# Patient Record
Sex: FEMALE | Race: WHITE | NOT HISPANIC OR LATINO | URBAN - METROPOLITAN AREA
[De-identification: names, ages, dates, MRNs, and addresses within clinical notes are randomized per-mention and may not be internally consistent; named-entity substitution may affect disease eponyms.]

---

## 2017-07-21 PROBLEM — 63305008 STRICTURE OF ESOPHAGUS: Status: ACTIVE | Noted: 2017-07-21

## 2020-07-30 ENCOUNTER — OFFICE VISIT (OUTPATIENT)
Dept: URBAN - METROPOLITAN AREA CLINIC 78 | Facility: CLINIC | Age: 85
End: 2020-07-30

## 2020-07-30 RX ORDER — LISINOPRIL 20 MG/1
TABLET ORAL
Qty: 0 | Refills: 0 | Status: ACTIVE | COMMUNITY
Start: 1900-01-01 | End: 1900-01-01

## 2020-07-30 RX ORDER — CHROMIUM 200 MCG
TABLET ORAL
Qty: 0 | Refills: 0 | Status: ACTIVE | COMMUNITY
Start: 1900-01-01 | End: 1900-01-01

## 2020-07-30 RX ORDER — BISACODYL 5 MG/1
TAKE 2 TABLETS BY ORAL ROUTE ONCE TABLET, COATED ORAL
Qty: 2 | Refills: 0 | Status: ACTIVE | COMMUNITY
Start: 2018-01-08 | End: 1900-01-01

## 2020-07-30 RX ORDER — APIXABAN 2.5 MG/1
TABLET, FILM COATED ORAL
Qty: 0 | Refills: 0 | Status: ACTIVE | COMMUNITY
Start: 1900-01-01 | End: 1900-01-01

## 2020-07-30 NOTE — HPI-OTHER HISTORIES
Patient had been last seen at [facility] by Dr. Beni Bhatia in 2017 for diarrhea.  Patient is on Eliquis for A fib. She also had 2 TIAs.  History of diverticulitis requiring colectomy performed 15 year ago. Patients sister has severe Crohn's disease.  Patient reports frequent loose stools, few streaks of fresh blood. Along with right lower quadrant abdominal pain. She reports symptoms are worse with gluten, diary and sugar.   Summary of prior workup:  - EGD and colonoscopy by Dr. Beni Bhatia on 2/21/18 revealed a prior end-to-end colonic anastomosis in the descending colon which was patent and characterized by healthy appearing mucosa. Biopsies were negative for microscopic colitis. Terminal ileum normal (biopsies consistent with normal villoous architecture). Ascending/transverse/descending diverticulosis. - Bloodwork on 12/15/17 revealed a normal CMP except for a creatinine of 0.9, normal CBC and CRP of 2.2. GI PCR panel was negative. Fecal calprotectin was < 16. - EGDin 2015 with dilation, large hiatal hernia, nonobstructing Schatzki's ring with redundant mucosa in the distal esophagus. Biopsy revealed esophagitis, no H. pylori.  - CT A/P Feb 2015- 2.8 by 1.9 cm cystic lesion in the uncinate process of the pancreas. Small HH.

## 2020-08-31 ENCOUNTER — OFFICE VISIT (OUTPATIENT)
Dept: URBAN - METROPOLITAN AREA CLINIC 78 | Facility: CLINIC | Age: 85
End: 2020-08-31
Payer: MEDICARE

## 2020-08-31 DIAGNOSIS — R13.19 CERVICAL DYSPHAGIA: ICD-10-CM

## 2020-08-31 DIAGNOSIS — K86.2 PANCREATIC CYSTIC LESION: ICD-10-CM

## 2020-08-31 DIAGNOSIS — I48.91 ATRIAL FIBRILLATION: ICD-10-CM

## 2020-08-31 PROCEDURE — 99244 OFF/OP CNSLTJ NEW/EST MOD 40: CPT | Performed by: INTERNAL MEDICINE

## 2020-08-31 PROCEDURE — 99214 OFFICE O/P EST MOD 30 MIN: CPT | Performed by: INTERNAL MEDICINE

## 2020-08-31 RX ORDER — LISINOPRIL 20 MG/1
TABLET ORAL
Qty: 0 | Refills: 0 | Status: ACTIVE | COMMUNITY
Start: 1900-01-01

## 2020-08-31 RX ORDER — BISACODYL 5 MG/1
TAKE 2 TABLETS BY ORAL ROUTE ONCE TABLET, COATED ORAL
Qty: 2 | Refills: 0 | Status: DISCONTINUED | COMMUNITY
Start: 2018-01-08

## 2020-08-31 RX ORDER — CHROMIUM 200 MCG
TABLET ORAL
Qty: 0 | Refills: 0 | Status: DISCONTINUED | COMMUNITY
Start: 1900-01-01

## 2020-08-31 RX ORDER — APIXABAN 2.5 MG/1
TABLET, FILM COATED ORAL
Qty: 0 | Refills: 0 | Status: ACTIVE | COMMUNITY
Start: 1900-01-01

## 2020-08-31 NOTE — PHYSICAL EXAM CONSTITUTIONAL:
well developed, well nourished , in no acute distress , ambulating without difficulty using a cane, normal communication ability

## 2020-08-31 NOTE — HPI-OTHER HISTORIES
Patient had been last seen at [facility] by Dr. Beni Bhatia in 2017 for diarrhea.   She is having issues with recurrent dysphagia to solid foods and at times pills, not to liquids. The food is getting lodged for either a few minutes or sometimes hours, until it finally goes down. No nausea or vomiting. Apepite has been good. No unintentional weight loss. No recent diarrhea. She has IBS and when she eats raw salads this can sometimes trigger loose stools.  She tells me that she was recently diagnosed with a UTI and is asking me to send in a prescription for Zithromax to treat. Patient is on Eliquis for A fib. She also had 2 TIAs.  History of diverticulitis requiring partial colectomy performed year ago. Patients sister has severe Crohn's disease.    Summary of prior workup:  - Colonoscopy by Dr. Beni Bhatia on 2/21/18 revealed a prior end-to-end colonic anastomosis in the descending colon which was patent and characterized by healthy appearing mucosa. Biopsies were negative for microscopic colitis. Terminal ileum normal (biopsies consistent with normal villous architecture). Ascending/transverse/descending diverticulosis. - Bloodwork on 12/15/17 revealed a normal CMP except for a creatinine of 0.9, normal CBC and CRP of 2.2. GI PCR panel was negative. Fecal calprotectin was < 16. - EGD in 2015 with dilation, large hiatal hernia, nonobstructing Schatzki's ring with redundant mucosa in the distal esophagus. Biopsy revealed esophagitis, no H. pylori.  - CT A/P Feb 2015- 2.8 by 1.9 cm cystic lesion in the uncinate process of the pancreas. Small HH.

## 2020-09-04 ENCOUNTER — TELEPHONE ENCOUNTER (OUTPATIENT)
Dept: URBAN - METROPOLITAN AREA CLINIC 78 | Facility: CLINIC | Age: 85
End: 2020-09-04

## 2020-09-10 ENCOUNTER — TELEPHONE ENCOUNTER (OUTPATIENT)
Dept: URBAN - METROPOLITAN AREA CLINIC 92 | Facility: CLINIC | Age: 85
End: 2020-09-10

## 2020-09-10 RX ORDER — PREDNISONE 50 MG/1
1 TABLET TABLET ORAL
Qty: 3 TABLET | Refills: 0 | OUTPATIENT

## 2020-09-18 ENCOUNTER — TELEPHONE ENCOUNTER (OUTPATIENT)
Dept: URBAN - METROPOLITAN AREA CLINIC 78 | Facility: CLINIC | Age: 85
End: 2020-09-18

## 2020-10-07 ENCOUNTER — OFFICE VISIT (OUTPATIENT)
Dept: URBAN - METROPOLITAN AREA SURGERY CENTER 15 | Facility: SURGERY CENTER | Age: 85
End: 2020-10-07
Payer: MEDICARE

## 2020-10-07 DIAGNOSIS — K44.9 DIAPHRAGMATIC HERNIA: ICD-10-CM

## 2020-10-07 DIAGNOSIS — K31.89 ACQUIRED DEFORMITY OF DUODENUM: ICD-10-CM

## 2020-10-07 DIAGNOSIS — K22.2 ACQUIRED ESOPHAGEAL RING: ICD-10-CM

## 2020-10-07 DIAGNOSIS — K21.00 CHRONIC REFLUX ESOPHAGITIS: ICD-10-CM

## 2020-10-07 PROCEDURE — G8907 PT DOC NO EVENTS ON DISCHARG: HCPCS | Performed by: INTERNAL MEDICINE

## 2020-10-07 PROCEDURE — 43239 EGD BIOPSY SINGLE/MULTIPLE: CPT | Performed by: INTERNAL MEDICINE

## 2020-10-07 PROCEDURE — 43249 ESOPH EGD DILATION <30 MM: CPT | Performed by: INTERNAL MEDICINE

## 2020-10-07 RX ORDER — APIXABAN 2.5 MG/1
TABLET, FILM COATED ORAL
Qty: 0 | Refills: 0 | Status: ACTIVE | COMMUNITY
Start: 1900-01-01

## 2020-10-07 RX ORDER — LISINOPRIL 20 MG/1
TABLET ORAL
Qty: 0 | Refills: 0 | Status: ACTIVE | COMMUNITY
Start: 1900-01-01

## 2020-10-07 RX ORDER — PREDNISONE 50 MG/1
1 TABLET TABLET ORAL
Qty: 3 TABLET | Refills: 0 | Status: ACTIVE | COMMUNITY

## 2020-10-23 ENCOUNTER — DASHBOARD ENCOUNTERS (OUTPATIENT)
Age: 85
End: 2020-10-23

## 2020-11-20 ENCOUNTER — OFFICE VISIT (OUTPATIENT)
Dept: URBAN - METROPOLITAN AREA TELEHEALTH 2 | Facility: TELEHEALTH | Age: 85
End: 2020-11-20

## 2020-11-20 ENCOUNTER — TELEPHONE ENCOUNTER (OUTPATIENT)
Dept: URBAN - METROPOLITAN AREA CLINIC 78 | Facility: CLINIC | Age: 85
End: 2020-11-20

## 2020-11-20 RX ORDER — PREDNISONE 50 MG/1
1 TABLET TABLET ORAL
Qty: 3 TABLET | Refills: 0 | Status: ACTIVE | COMMUNITY

## 2020-11-20 RX ORDER — APIXABAN 2.5 MG/1
TABLET, FILM COATED ORAL
Qty: 0 | Refills: 0 | Status: ACTIVE | COMMUNITY
Start: 1900-01-01

## 2020-11-20 RX ORDER — LISINOPRIL 20 MG/1
TABLET ORAL
Qty: 0 | Refills: 0 | Status: ACTIVE | COMMUNITY
Start: 1900-01-01

## 2020-11-20 NOTE — HPI-OTHER HISTORIES
Patient had been last seen at [facility] by Dr. Beni Bhatia in 2017 for diarrhea.   She is having issues with recurrent dysphagia to solid foods and at times pills, not to liquids. The food is getting lodged for either a few minutes or sometimes hours, until it finally goes down. No nausea or vomiting. Apepite has been good. No unintentional weight loss. No recent diarrhea. She has IBS and when she eats raw salads this can sometimes trigger loose stools.  She tells me that she was recently diagnosed with a UTI and is asking me to send in a prescription for Zithromax to treat. Patient is on Eliquis for A fib. She also had 2 TIAs.  History of diverticulitis requiring partial colectomy performed year ago. Patients sister has severe Crohn's disease.    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 - Colonoscopy by Dr. Beni Bhatia on 2/21/18 revealed a prior end-to-end colonic anastomosis in the descending colon which was patent and characterized by healthy appearing mucosa. Biopsies were negative for microscopic colitis. Terminal ileum normal (biopsies consistent with normal villous architecture). Ascending/transverse/descending diverticulosis. - Bloodwork on 12/15/17 revealed a normal CMP except for a creatinine of 0.9, normal CBC and CRP of 2.2. GI PCR panel was negative. Fecal calprotectin was < 16. - EGD in 2015 with dilation, large hiatal hernia, nonobstructing Schatzki's ring with redundant mucosa in the distal esophagus. Biopsy revealed esophagitis, no H. pylori.  - CT A/P Feb 2015- 2.8 by 1.9 cm cystic lesion in the uncinate process of the pancreas. Small HH.

## 2020-12-18 ENCOUNTER — OFFICE VISIT (OUTPATIENT)
Dept: URBAN - METROPOLITAN AREA TELEHEALTH 2 | Facility: TELEHEALTH | Age: 85
End: 2020-12-18

## 2022-04-30 ENCOUNTER — TELEPHONE ENCOUNTER (OUTPATIENT)
Dept: URBAN - METROPOLITAN AREA CLINIC 121 | Facility: CLINIC | Age: 87
End: 2022-04-30

## 2022-05-01 ENCOUNTER — TELEPHONE ENCOUNTER (OUTPATIENT)
Dept: URBAN - METROPOLITAN AREA CLINIC 121 | Facility: CLINIC | Age: 87
End: 2022-05-01

## 2022-05-01 RX ORDER — LISINOPRIL 20 MG/1
TABLET ORAL
Status: ACTIVE | COMMUNITY
Start: 2016-03-30

## 2022-05-01 RX ORDER — FLUTICASONE PROPIONATE 50 UG/1
SPRAY, METERED NASAL
Status: ACTIVE | COMMUNITY
Start: 2016-03-30

## 2022-05-01 RX ORDER — APIXABAN 2.5 MG/1
TABLET, FILM COATED ORAL
Status: ACTIVE | COMMUNITY
Start: 2016-03-30

## 2022-05-01 RX ORDER — CARVEDILOL 12.5 MG/1
TABLET, FILM COATED ORAL
Status: ACTIVE | COMMUNITY
Start: 2016-03-30